# Patient Record
Sex: FEMALE | Race: WHITE | NOT HISPANIC OR LATINO | URBAN - NONMETROPOLITAN AREA
[De-identification: names, ages, dates, MRNs, and addresses within clinical notes are randomized per-mention and may not be internally consistent; named-entity substitution may affect disease eponyms.]

---

## 2019-08-12 ENCOUNTER — APPOINTMENT (RX ONLY)
Dept: URBAN - NONMETROPOLITAN AREA CLINIC 13 | Facility: CLINIC | Age: 39
Setting detail: DERMATOLOGY
End: 2019-08-12

## 2019-08-12 DIAGNOSIS — Z41.9 ENCOUNTER FOR PROCEDURE FOR PURPOSES OTHER THAN REMEDYING HEALTH STATE, UNSPECIFIED: ICD-10-CM

## 2019-08-12 PROCEDURE — ? BOTOX

## 2019-08-12 NOTE — PROCEDURE: BOTOX
Price (Use Numbers Only, No Special Characters Or $): 0
Lot #: [id number]
Consent: Samples used. Written consent obtained. Risks include but not limited to lid/brow ptosis, bruising, swelling, diplopia, temporary effect, incomplete chemical denervation.
Post-Care Instructions: Patient instructed to not lie down for 4 hours or touch treated areas and limit physical activity for 24 hours.\\n\\n\\nPatient was discussed with Dr Olvera who approved and ordered this treatment plan
Dilution (U/0.1 Cc): 1
Additional Area 1 Location: orbicularis oris
Glabellar Complex Units: 20
Forehead Units: 6
Detail Level: Detailed
Expiration Date (Month Year): 01/2022
Inferior Lateral Orbicularis Oculi Units: 8